# Patient Record
Sex: FEMALE | Race: OTHER | HISPANIC OR LATINO | Employment: FULL TIME | URBAN - METROPOLITAN AREA
[De-identification: names, ages, dates, MRNs, and addresses within clinical notes are randomized per-mention and may not be internally consistent; named-entity substitution may affect disease eponyms.]

---

## 2023-07-11 PROBLEM — D72.820 LYMPHOCYTOSIS: Status: ACTIVE | Noted: 2023-07-11

## 2023-07-11 NOTE — PROGRESS NOTES
"07/17/23    Subjective    Chief Complaint:  Lymphocytosis    HPI:  32 female from [place] referred for consultation by Dr. Cobos because of a persistently elevated lymphocyte count. Lab accompanying the referral includes normal WBC, H/H and platelet counts but both relative and absolute lymphocyte counts of 3.8 and 5.9 with 72% lymphocytes.No \"B\" sx's.  Brother has Li Fraumeni syndrome and has leiomyosarcoma.     ROS:    Constitutional: No weight loss  Skin: No rash or jaundice  HENT: No change in eyesight or hearing  Cardiovascular:No chest pain or arrythmia  Respiratory:No cough or SOB  GI:No nausea, vomiting, diarrhea, constipation  :No dysuria or frequency  Musculoskeletal:No bone or joint pain  Neuro:No sx's of neuropathy  Psych: No complaints    PMH:      Not on File    History reviewed. No pertinent past medical history.     History reviewed. No pertinent surgical history.     Medications:    Current Outpatient Medications on File Prior to Encounter   Medication Sig Dispense Refill    MIRENA, 52 MG, 20 MCG/DAY IUD        No current facility-administered medications on file prior to encounter.       Social History     Tobacco Use    Smoking status: Never    Smokeless tobacco: Never   Substance Use Topics    Alcohol use: Not on file        History reviewed. No pertinent family history.     Objective    Vitals:    /80 (BP Location: Right arm, Patient Position: Sitting, BP Cuff Size: Adult)   Pulse 66   Temp 36.6 °C (97.9 °F) (Temporal)   Resp 13   Ht 1.549 m (5' 1\")   Wt (!) 125 kg (275 lb 12.7 oz)   LMP  (LMP Unknown)   SpO2 97%   BMI 52.11 kg/m²     Physical Exam:    Appears well-developed, moderately obese. No distress.    Head -  Normocephalic .   Eyes - Pupils are equal. Conjunctivae normal. No scleral icterus.   Ears - normal hearing  Neck - Neck supple. No thyromegaly  Cardiovascular - Normal rate, regular rhythm, normal heart sounds and intact distal pulses. No  gallop, murmur or " rub  Pulmonary - Normal breath sounds.  No wheeze, rales or rhonchi  Breast - symmetrical. No mass on indentation.  Abdominal -Soft. No distension, tenderness, organomegaly or mass  Extremities-  No edema or tenderness.    Nodes - No submental, submandibular, preauricular, cervical, axillary or inguinal adenopathy.    Neurological -   Alert and oriented.  Skin - Skin is warm and dry. No rash noted. Not diaphoretic. No erythema. No pallor. No jaundice   Psychiatric -  Normal mood and affect.    Labs:    See PI    Assessment  Need to r/o CLL. Needs assessment for Li Fraumeni syndrome  Imp:    Visit Diagnosis:    1. Lymphocytosis  CBC WITH DIFFERENTIAL    LDH    LEUK/LYMPH PHENOTYPING, FLOW CYTOMETRY    Referral to Genetic Research Studies    Referral to Genetics      2. Family history of Li-Fraumeni syndrome          Plan:  Above lab and then a Virtual visit  Genetics referral    Nahum Calles M.D.

## 2023-07-17 ENCOUNTER — HOSPITAL ENCOUNTER (OUTPATIENT)
Dept: HEMATOLOGY ONCOLOGY | Facility: MEDICAL CENTER | Age: 33
End: 2023-07-17
Attending: INTERNAL MEDICINE
Payer: COMMERCIAL

## 2023-07-17 ENCOUNTER — HOSPITAL ENCOUNTER (OUTPATIENT)
Dept: LAB | Facility: MEDICAL CENTER | Age: 33
End: 2023-07-17
Attending: INTERNAL MEDICINE
Payer: COMMERCIAL

## 2023-07-17 VITALS
RESPIRATION RATE: 13 BRPM | TEMPERATURE: 97.9 F | HEIGHT: 61 IN | DIASTOLIC BLOOD PRESSURE: 80 MMHG | WEIGHT: 275.8 LBS | BODY MASS INDEX: 52.07 KG/M2 | HEART RATE: 66 BPM | SYSTOLIC BLOOD PRESSURE: 102 MMHG | OXYGEN SATURATION: 97 %

## 2023-07-17 DIAGNOSIS — D72.820 LYMPHOCYTOSIS: ICD-10-CM

## 2023-07-17 DIAGNOSIS — Z15.01 FAMILY HISTORY OF LI-FRAUMENI SYNDROME: ICD-10-CM

## 2023-07-17 LAB
BASOPHILS # BLD AUTO: 0.5 % (ref 0–1.8)
BASOPHILS # BLD: 0.04 K/UL (ref 0–0.12)
EOSINOPHIL # BLD AUTO: 0.11 K/UL (ref 0–0.51)
EOSINOPHIL NFR BLD: 1.4 % (ref 0–6.9)
ERYTHROCYTE [DISTWIDTH] IN BLOOD BY AUTOMATED COUNT: 45.1 FL (ref 35.9–50)
HCT VFR BLD AUTO: 43 % (ref 37–47)
HGB BLD-MCNC: 14 G/DL (ref 12–16)
IMM GRANULOCYTES # BLD AUTO: 0.02 K/UL (ref 0–0.11)
IMM GRANULOCYTES NFR BLD AUTO: 0.3 % (ref 0–0.9)
LDH SERPL L TO P-CCNC: 208 U/L (ref 107–266)
LYMPHOCYTES # BLD AUTO: 4.02 K/UL (ref 1–4.8)
LYMPHOCYTES NFR BLD: 50.8 % (ref 22–41)
MCH RBC QN AUTO: 30.4 PG (ref 27–33)
MCHC RBC AUTO-ENTMCNC: 32.6 G/DL (ref 32.2–35.5)
MCV RBC AUTO: 93.3 FL (ref 81.4–97.8)
MONOCYTES # BLD AUTO: 0.53 K/UL (ref 0–0.85)
MONOCYTES NFR BLD AUTO: 6.7 % (ref 0–13.4)
NEUTROPHILS # BLD AUTO: 3.19 K/UL (ref 1.82–7.42)
NEUTROPHILS NFR BLD: 40.3 % (ref 44–72)
NRBC # BLD AUTO: 0 K/UL
NRBC BLD-RTO: 0 /100 WBC (ref 0–0.2)
PLATELET # BLD AUTO: 348 K/UL (ref 164–446)
PMV BLD AUTO: 9.3 FL (ref 9–12.9)
RBC # BLD AUTO: 4.61 M/UL (ref 4.2–5.4)
WBC # BLD AUTO: 7.9 K/UL (ref 4.8–10.8)

## 2023-07-17 PROCEDURE — 85025 COMPLETE CBC W/AUTO DIFF WBC: CPT

## 2023-07-17 PROCEDURE — 83615 LACTATE (LD) (LDH) ENZYME: CPT

## 2023-07-17 PROCEDURE — 99212 OFFICE O/P EST SF 10 MIN: CPT | Performed by: INTERNAL MEDICINE

## 2023-07-17 PROCEDURE — 88184 FLOWCYTOMETRY/ TC 1 MARKER: CPT

## 2023-07-17 PROCEDURE — 36415 COLL VENOUS BLD VENIPUNCTURE: CPT

## 2023-07-17 PROCEDURE — 88185 FLOWCYTOMETRY/TC ADD-ON: CPT | Mod: 91

## 2023-07-17 PROCEDURE — 99204 OFFICE O/P NEW MOD 45 MIN: CPT | Performed by: INTERNAL MEDICINE

## 2023-07-17 RX ORDER — LEVONORGESTREL 52 MG/1
INTRAUTERINE DEVICE INTRAUTERINE
COMMUNITY
Start: 2023-05-22

## 2023-07-17 ASSESSMENT — PAIN SCALES - GENERAL: PAINLEVEL: NO PAIN

## 2023-07-17 ASSESSMENT — PATIENT HEALTH QUESTIONNAIRE - PHQ9
SUM OF ALL RESPONSES TO PHQ QUESTIONS 1-9: 8
5. POOR APPETITE OR OVEREATING: 2 - MORE THAN HALF THE DAYS
CLINICAL INTERPRETATION OF PHQ2 SCORE: 2

## 2023-07-19 LAB
ACUTE LEUKEMIA MARKERS SPEC-IMP: NORMAL
EVENTS COUNTED SPEC: 26 MARKERS
SOURCE 9121: NORMAL

## 2023-08-18 ENCOUNTER — TELEPHONE (OUTPATIENT)
Dept: HEMATOLOGY ONCOLOGY | Facility: MEDICAL CENTER | Age: 33
End: 2023-08-18

## 2023-08-18 NOTE — TELEPHONE ENCOUNTER
Received call from Pt r/t lab results from 7/17 visit.  Discussed with Dr Calles and called the Pt back to educate her about her labs concerning neutrophils and lymphocyte percentages.  Reported that the Absolute of each was normal and that she has nothing concerning in her labs and to continue to follow with up with Genetics.  Pt stated she has her appointment for it next week.

## 2023-08-24 ENCOUNTER — RESEARCH ENCOUNTER (OUTPATIENT)
Dept: RESEARCH | Facility: WORKSITE | Age: 33
End: 2023-08-24

## 2023-08-24 DIAGNOSIS — Z00.6 RESEARCH STUDY PATIENT: ICD-10-CM

## 2023-08-24 NOTE — RESEARCH NOTE
Confirmed with the participant which designated provider they would like study results shared with. Patient will have an opportunity to share the results with any providers of their choosing in the future by accessing their results from [employer].

## 2023-09-20 NOTE — PROGRESS NOTES
This evaluation was conducted via Zoom using secure and encrypted videoconferencing technology. The patient was in their home in the [facility].    The patient's identity was confirmed and verbal consent was obtained for this virtual visit.    09/26/23    Subjective    Chief Complaint:  Follow up from consultation for relative lymphocytosis    HPI:  32 female from [place] with a brother with Li Fraumini syndrome. Seen here for a relative increase in lymphocytes. CBC is otherwise normal and flow cytomtry was negative.     ROS:    Constitutional: No weight loss  Skin: No rash or jaundice  HENT: No change in eyesight or hearing  Cardiovascular:No chest pain or arrythmia  Respiratory:No cough or SOB  GI:No nausea, vomiting, diarrhea, constipation  :No dysuria or frequency  Musculoskeletal:No bone or joint pain  Neuro:No sx's of neuropathy  Psych: No complaints    PMH:      Not on File    No past medical history on file.     No past surgical history on file.     Medications:    Current Outpatient Medications on File Prior to Visit   Medication Sig Dispense Refill    MIRENA, 52 MG, 20 MCG/DAY IUD        No current facility-administered medications on file prior to visit.       Social History     Tobacco Use    Smoking status: Never    Smokeless tobacco: Never   Substance Use Topics    Alcohol use: Not on file        No family history on file.     Objective    Vitals:    There were no vitals taken for this visit.    Physical Exam:    Appears well-developed and well-nourished. No distress.    Head -  Normocephalic .   Eyes - Pupils are equal. Conjunctivae normal. No scleral icterus.   Ears - normal hearing  Neurological -   Alert and oriented.  Skin -  No rash noted. Not diaphoretic. No erythema. No pallor. No jaundice   Psychiatric -  Normal mood and affect.    Labs:     Latest Reference Range & Units 07/17/23 15:10   WBC 4.8 - 10.8 K/uL 7.9   RBC 4.20 - 5.40 M/uL 4.61   Hemoglobin 12.0 - 16.0 g/dL 14.0    Hematocrit 37.0 - 47.0 % 43.0   MCV 81.4 - 97.8 fL 93.3   MCH 27.0 - 33.0 pg 30.4   MCHC 32.2 - 35.5 g/dL 32.6   RDW 35.9 - 50.0 fL 45.1   Platelet Count 164 - 446 K/uL 348   MPV 9.0 - 12.9 fL 9.3   Neutrophils-Polys 44.00 - 72.00 % 40.30 (L)   Neutrophils (Absolute) 1.82 - 7.42 K/uL 3.19   Lymphocytes 22.00 - 41.00 % 50.80 (H)   Lymphs (Absolute) 1.00 - 4.80 K/uL 4.02   Monocytes 0.00 - 13.40 % 6.70      Latest Reference Range & Units 07/17/23 15:10   LDH Total 107 - 266 U/L 208       Flow Cytometry 7/17/23  IMPRESSION:   1. No abnormal myeloid, B cell, T cell, or NK cell population   identified.  Assessment    Imp:    Visit Diagnosis:    1. Lymphocytosis        2. Family history of Li-Fraumeni syndrome          Plan:  No heme follow up needed. Will place referral to Genetics re Li Fraumeni James R Cohen, M.D.

## 2023-09-21 LAB
APOB+LDLR+PCSK9 GENE MUT ANL BLD/T: NOT DETECTED
BRCA1+BRCA2 DEL+DUP + FULL MUT ANL BLD/T: NOT DETECTED
MLH1+MSH2+MSH6+PMS2 GN DEL+DUP+FUL M: NOT DETECTED

## 2023-09-26 ENCOUNTER — HOSPITAL ENCOUNTER (OUTPATIENT)
Dept: HEMATOLOGY ONCOLOGY | Facility: MEDICAL CENTER | Age: 33
End: 2023-09-26
Attending: INTERNAL MEDICINE
Payer: MEDICAID

## 2023-09-26 VITALS — BODY MASS INDEX: 52.3 KG/M2 | HEIGHT: 61 IN | WEIGHT: 277 LBS

## 2023-09-26 DIAGNOSIS — Z15.01 FAMILY HISTORY OF LI-FRAUMENI SYNDROME: ICD-10-CM

## 2023-09-26 DIAGNOSIS — D72.820 LYMPHOCYTOSIS: ICD-10-CM

## 2023-09-26 PROCEDURE — 99213 OFFICE O/P EST LOW 20 MIN: CPT | Mod: 95 | Performed by: INTERNAL MEDICINE

## 2023-11-17 ENCOUNTER — HOSPITAL ENCOUNTER (OUTPATIENT)
Facility: MEDICAL CENTER | Age: 33
End: 2023-11-17
Attending: SURGERY | Admitting: SURGERY
Payer: MEDICAID

## 2024-04-08 ENCOUNTER — HOSPITAL ENCOUNTER (OUTPATIENT)
Facility: MEDICAL CENTER | Age: 34
End: 2024-04-08
Attending: SURGERY | Admitting: SURGERY
Payer: MEDICAID

## 2024-04-22 ENCOUNTER — APPOINTMENT (OUTPATIENT)
Dept: ADMISSIONS | Facility: MEDICAL CENTER | Age: 34
End: 2024-04-22
Attending: SURGERY
Payer: MEDICAID

## 2024-04-29 ENCOUNTER — PRE-ADMISSION TESTING (OUTPATIENT)
Dept: ADMISSIONS | Facility: MEDICAL CENTER | Age: 34
End: 2024-04-29
Attending: SURGERY
Payer: MEDICAID

## 2024-04-29 RX ORDER — SERTRALINE HYDROCHLORIDE 100 MG/1
100 TABLET, FILM COATED ORAL DAILY
COMMUNITY
Start: 2024-04-24

## 2024-04-29 RX ORDER — CETIRIZINE HYDROCHLORIDE 10 MG/1
10 TABLET ORAL PRN
COMMUNITY

## 2024-04-29 RX ORDER — CHOLECALCIFEROL (VITAMIN D3) 125 MCG
CAPSULE ORAL DAILY
COMMUNITY

## 2024-04-29 NOTE — OR NURSING
RN tele pre admit appointment complete:  Patient verbalized an understanding that she is not to take any vitamins, minerals, or supplements for 7 days prior to surgery and no NSAIDs including ibuprofen, advil, motrin, naproxen or aleve for at least 5 days prior to surgery. Patient also verbalized an understanding that she can continue her zyrtec and sertraline as prescribed and instructions for birth control will come from the provider/surgeon.  Preparing For Your Procedure packet reviewed with patient, including fasting and bathing guidelines.  Surgery date 5/28/2024.  The above information is per anesthesia guidelines.  Medication list with medication instructions will be given to patient during pre testing appointment on 5/13/2024.

## 2024-05-13 ENCOUNTER — APPOINTMENT (OUTPATIENT)
Dept: ADMISSIONS | Facility: MEDICAL CENTER | Age: 34
End: 2024-05-13
Attending: SURGERY
Payer: MEDICAID

## 2024-05-13 DIAGNOSIS — Z01.812 PRE-OPERATIVE LABORATORY EXAMINATION: ICD-10-CM

## 2024-05-13 DIAGNOSIS — Z01.810 PRE-OPERATIVE CARDIOVASCULAR EXAMINATION: ICD-10-CM
